# Patient Record
Sex: FEMALE | Race: WHITE
[De-identification: names, ages, dates, MRNs, and addresses within clinical notes are randomized per-mention and may not be internally consistent; named-entity substitution may affect disease eponyms.]

---

## 2018-09-09 ENCOUNTER — HOSPITAL ENCOUNTER (EMERGENCY)
Dept: HOSPITAL 41 - JD.ED | Age: 43
Discharge: HOME | End: 2018-09-09
Payer: COMMERCIAL

## 2018-09-09 DIAGNOSIS — R10.9: ICD-10-CM

## 2018-09-09 DIAGNOSIS — Z79.4: ICD-10-CM

## 2018-09-09 DIAGNOSIS — E11.9: ICD-10-CM

## 2018-09-09 DIAGNOSIS — R30.0: Primary | ICD-10-CM

## 2018-09-09 PROCEDURE — 99284 EMERGENCY DEPT VISIT MOD MDM: CPT

## 2018-09-09 PROCEDURE — 81025 URINE PREGNANCY TEST: CPT

## 2018-09-09 PROCEDURE — 74176 CT ABD & PELVIS W/O CONTRAST: CPT

## 2018-09-09 PROCEDURE — 87086 URINE CULTURE/COLONY COUNT: CPT

## 2018-09-09 PROCEDURE — 81001 URINALYSIS AUTO W/SCOPE: CPT

## 2018-09-09 NOTE — EDM.PDOC
ED HPI GENERAL MEDICAL PROBLEM





- General


Chief Complaint: Genitourinary Problem


Stated Complaint: UTI


Time Seen by Provider: 09/09/18 19:10


Source of Information: Reports: Patient, Family


History Limitations: Reports: No Limitations





- History of Present Illness


INITIAL COMMENTS - FREE TEXT/NARRATIVE: 





The patient states that she is on a road trip, visiting from [place]. She states 

that she developed dysuria, urinary frequency, right flank pain, nausea, and 

emesis tonight. No prior similar symptoms. No recent fever. The patient states 

that she took naproxen this morning, then again just PTA.








Treatments PTA: Reports: NSAIDS


  ** Right Flank


Pain Score (Numeric/FACES): 9





- Related Data


 Allergies











Allergy/AdvReac Type Severity Reaction Status Date / Time


 


No Known Allergies Allergy   Verified 09/09/18 19:12











Home Meds: 


 Home Meds





Dapagliflozin Propanediol [Farxiga] 10 mg PO BEDTIME 09/09/18 [History]


Insulin Degludec [Tresiba Flextouch U-200] 80 units INJECT BEDTIME 09/09/18 [

History]


Sulfamethoxazole/Trimethoprim [Bactrim Ds Tablet] 1 tab PO Q12H #5 tablet 09/09/ 18 [Rx]











Past Medical History


OB/GYN History: Reports: Pregnancy


Endocrine/Metabolic History: Reports: Diabetes, Type II, Obesity/BMI 30+





- Past Surgical History


HEENT Surgical History: Reports: Oral Surgery (wisdom teeth extraction)


Female  Surgical History: Reports: Hysterectomy





Social & Family History





- Family History


Family Medical History: Noncontributory





- Tobacco Use


Smoking Status *Q: Never Smoker





- Caffeine Use


Caffeine Use: Reports: Soda





- Alcohol Use


Alcohol Use History: Yes


Alcohol Use Frequency: Socially





- Recreational Drug Use


Recreational Drug Use: No





- Living Situation & Occupation


Living situation: Reports: , with Spouse


Occupation: Employed (Manager of a [employer])





ED ROS GENERAL





- Review of Systems


Review Of Systems: ROS reveals no pertinent complaints other than HPI.





ED EXAM, RENAL/





- Physical Exam


Exam: See Below


Exam Limited By: No Limitations


General Appearance: Alert, WD/WN, No Apparent Distress


Eye Exam: Bilateral Eye: EOMI, Normal Inspection


Ears: Normal External Exam, Hearing Grossly Normal


Nose: Normal Inspection, No Blood


Throat/Mouth: Normal Inspection, Normal Lips, Normal Voice, No Airway Compromise


Head: Atraumatic, Normocephalic


Neck: Normal Inspection, Full Range of Motion


Respiratory/Chest: No Respiratory Distress, Lungs Clear, Normal Breath Sounds, 

No Accessory Muscle Use


Cardiovascular: Normal Peripheral Pulses, Regular Rate, Rhythm, No Gallop, No 

JVD, No Murmur, No Rub


GI/Abdominal: Normal Bowel Sounds, Soft, Non-Tender, No Organomegaly, No 

Distention, No Abnormal Bruit, No Mass, Other (Obese)


 (Female) Exam: Deferred


Rectal (Female) Exam: Deferred


Back Exam: Normal Inspection, Full Range of Motion, CVA Tenderness (R) (mild).  

No: CVA Tenderness (L)


Extremities: Normal Inspection, Normal Range of Motion, No Pedal Edema, Normal 

Capillary Refill


Neurological: Alert, Oriented, Normal Cognition, No Motor/Sensory Deficits


Psychiatric: Normal Affect


Skin Exam: Warm, Dry, Intact, Normal Color, No Rash





Course





- Vital Signs


Last Recorded V/S: 


 Last Vital Signs











Temp  36.6 C   09/09/18 19:08


 


Pulse  98   09/09/18 19:08


 


Resp  18   09/09/18 19:08


 


BP  189/93 H  09/09/18 19:08


 


Pulse Ox  97   09/09/18 19:08














- Orders/Labs/Meds


Labs: 


 Laboratory Tests











  09/09/18 09/09/18 Range/Units





  19:34 19:34 


 


Urine Color  Light yellow   (Yellow)  


 


Urine Appearance  Clear   (Clear)  


 


Urine pH  6.5   (5.0-8.0)  


 


Ur Specific Gravity  1.015   (1.005-1.030)  


 


Urine Protein  1+ H   (Negative)  


 


Urine Glucose (UA)  2+ H   (Negative)  


 


Urine Ketones  Negative   (Negative)  


 


Urine Occult Blood  1+ H   (Negative)  


 


Urine Nitrite  Negative   (Negative)  


 


Urine Bilirubin  Negative   (Negative)  


 


Urine Urobilinogen  0.2   (0.2-1.0)  


 


Ur Leukocyte Esterase  Trace H   (Negative)  


 


Urine RBC  5-10 H   (0-5)  /hpf


 


Urine WBC  40-50 H   (0-5)  /hpf


 


Ur Epithelial Cells  0-5   (0-5)  /hpf


 


Urine Bacteria  Not seen   (FEW)  /hpf


 


Urine Mucus  Not seen   (FEW)  /hpf


 


Urine HCG, Qual   Negative  (NEGATIVE)  











Meds: 


Medications














Discontinued Medications














Generic Name Dose Route Start Last Admin





  Trade Name Freq  PRN Reason Stop Dose Admin


 


Tamsulosin HCl  0.4 mg  09/09/18 20:23  09/09/18 20:30





  Flomax  PO  09/09/18 20:24  0.4 mg





  ONETIME ONE   Administration





     





     





     





     


 


Trimethoprim/Sulfamethoxazole  1 tab  09/09/18 21:29  09/09/18 21:34





  Septra Ds  PO  09/09/18 21:30  1 tab





  ONETIME ONE   Administration





     





     





     





     














- Re-Assessments/Exams


Free Text/Narrative Re-Assessment/Exam: 





09/09/18 20:25


The patient's urinalysis finds trace leukocyte esterase, but 40-50 WBCs, 1+ 

occult blood, and 5-10 RBCs, and nitrate negative with no bacteria. This 

urinalysis is not consistent with a UTI, although I will order a urine culture. 

I am more concerned that the patient may have a ureterolith. I discussed this 

with the patient, and we will proceed with a CT scan of the abdomen and pelvis 

without contrast. In meantime, I have ordered a urine strainer and oral Flomax. 

The patient states that her pain is relatively minor at this time, therefore I 

don't believe we need to place an IV.








09/09/18 21:24


CT of the abdomen and pelvis without IV contrast is read by [facility] as

:


1. Normal appendix. Small density within the distal appendix consistent with 

appendicolith.


2. There has been a hysterectomy.








09/09/18 21:30


Given the patient's symptoms, I believe it would be prudent to treat her for a 

UTI, even though her urinalysis is not really consistent with a UTI. As above, 

a urine culture has been sent. I will start the patient on empiric Bactrim, and 

prescribe a three-day course. The patient states that she has already purchased 

over-the-counter Azo, although has not yet started it. I advised her on its 

proper use, and recommended that she stay adequately hydrated.





Departure





- Departure


Time of Disposition: 21:31


Disposition: Home, Self-Care 01


Condition: Good


Clinical Impression: 


 Dysuria, Flank pain








- Discharge Information


*PRESCRIPTION DRUG MONITORING PROGRAM REVIEWED*: Not Applicable


*COPY OF PRESCRIPTION DRUG MONITORING REPORT IN PATIENT NA: Not Applicable


Prescriptions: 


Sulfamethoxazole/Trimethoprim [Bactrim Ds Tablet] 1 tab PO Q12H #5 tablet


Instructions:  Dysuria, Flank Pain, Adult, Easy-to-Read


Referrals: 


PCP,Not In Area [Primary Care Provider] - 


Forms:  ED Department Discharge


Additional Instructions: 


You were seen in the emergency room for painful urination, urinary frequency, 

right flank pain, along with nausea and vomiting tonight.





Workup in the ER included a urinalysis and a CT scan of your abdomen and pelvis.





Your urinalysis showed a lot of white blood cells, some red blood cells, but no 

bacteria. It is not clear that you have a urinary tract infection.





The CT scan did not find a kidney stone.





The cause of your symptoms is not entirely clear, but you will be treated for a 

urinary tract infection.





You have been started on the antibiotic Bactrim. A prescription for Bactrim has 

been sent to the [facility] located in the [employer] store. Take one 

tablet every 12 hours, starting tomorrow morning, Monday, 9/10/2018, as 

prescribed. Finish the entire prescription unless told otherwise by a doctor.





In addition to Bactrim, you may also take the over-the-counter medicine Azo. If 

you take Azo, take only a total of 6 doses - either 3 doses a day for 2 days, 

or 2 doses a day for 3 days - your choice. Azo will turn your urine orange - 

this is normal.





Stay adequately hydrated. It does not matter what type of fluid you drink.





If any other problems, please do not hesitate to return to the ER.

## 2018-09-10 NOTE — CT
CT abdomen and pelvis

 

Technique: Multiple axial sections were obtained from above the 

kidneys inferiorly through the bladder.  Intravenous and oral contrast

 not utilized.  Study has been performed as a ureteral stone protocol.

 

Findings: Small portion of the visualized noncontrast lower liver and 

spleen appear within normal limits.  Visualized adrenal glands are 

within normal limits.  Pancreas is within normal limits.  Gallbladder 

is collapsed but shows no calcified gallstones.

 

Kidneys show no abnormal calcifications.  No ureteral dilatation or 

ureteral stone is seen.

 

Aorta shows no aneurysm.  No retroperitoneal adenopathy or mesenteric 

abnormalities are seen.  No pelvic mass or adenopathy is seen.  

Appendix is seen and appears normal in size. 

 

No free fluid or inflammatory change in the abdomen or within the 

pelvis.

 

Bone window settings were reviewed which shows severe disc space 

narrowing at L5-S1 with vacuum phenomena as well as posterior and 

anterior osteophytes.

 

Impression:

1.  No renal calculi, ureteral dilatation or ureteral stone is seen.

2.  Other incidental findings.  Nothing acute is seen on noncontrast 

CT study of the abdomen and pelvis performed as a ureteral stone 

protocol.

 

Diagnostic code #2

 

I agree with preliminary report issued by [facility] ([facility] 

preliminary report dictated on 09/09/18, 10:14 PM Central Time)